# Patient Record
Sex: FEMALE | Race: WHITE | Employment: OTHER | URBAN - METROPOLITAN AREA
[De-identification: names, ages, dates, MRNs, and addresses within clinical notes are randomized per-mention and may not be internally consistent; named-entity substitution may affect disease eponyms.]

---

## 2019-11-18 ENCOUNTER — OFFICE VISIT (OUTPATIENT)
Dept: OBGYN CLINIC | Age: 34
End: 2019-11-18

## 2019-11-18 ENCOUNTER — HOSPITAL ENCOUNTER (OUTPATIENT)
Dept: LAB | Age: 34
Discharge: HOME | End: 2019-11-18
Payer: COMMERCIAL

## 2019-11-18 VITALS
DIASTOLIC BLOOD PRESSURE: 55 MMHG | RESPIRATION RATE: 20 BRPM | WEIGHT: 176.2 LBS | OXYGEN SATURATION: 99 % | SYSTOLIC BLOOD PRESSURE: 91 MMHG | HEIGHT: 70 IN | BODY MASS INDEX: 25.22 KG/M2 | HEART RATE: 76 BPM

## 2019-11-18 DIAGNOSIS — Z30.431 ENCOUNTER FOR ROUTINE CHECKING OF INTRAUTERINE CONTRACEPTIVE DEVICE (IUD): ICD-10-CM

## 2019-11-18 DIAGNOSIS — R10.2 CHRONIC PELVIC PAIN IN FEMALE: ICD-10-CM

## 2019-11-18 DIAGNOSIS — G89.29 CHRONIC PELVIC PAIN IN FEMALE: ICD-10-CM

## 2019-11-18 DIAGNOSIS — Z01.419 WELL WOMAN EXAM WITH ROUTINE GYNECOLOGICAL EXAM: Primary | ICD-10-CM

## 2019-11-18 DIAGNOSIS — Z01.419 WELL WOMAN EXAM WITH ROUTINE GYNECOLOGICAL EXAM: ICD-10-CM

## 2019-11-18 PROBLEM — E11.9 DIABETES MELLITUS (HCC): Status: ACTIVE | Noted: 2019-11-18

## 2019-11-18 PROCEDURE — 87624 HPV HI-RISK TYP POOLED RSLT: CPT

## 2019-11-18 PROCEDURE — 86780 TREPONEMA PALLIDUM: CPT

## 2019-11-18 PROCEDURE — 86803 HEPATITIS C AB TEST: CPT

## 2019-11-18 PROCEDURE — 36415 COLL VENOUS BLD VENIPUNCTURE: CPT

## 2019-11-18 PROCEDURE — 87389 HIV-1 AG W/HIV-1&-2 AB AG IA: CPT

## 2019-11-18 PROCEDURE — 87340 HEPATITIS B SURFACE AG IA: CPT

## 2019-11-18 PROCEDURE — 88175 CYTOPATH C/V AUTO FLUID REDO: CPT

## 2019-11-18 NOTE — PROGRESS NOTES
Subjective:   37 y.o. female for Well Woman Check. Reports intermittent RLQ pain x 16 years. Has been worked up extensively with no positive pathology noted. Most recent visit to [facility] with normal CT scan and TVUS. Patient's last menstrual period was 11/04/2019 (exact date). Social History: single partner, contraception - IUD. Pertinent past medical hstory: current smoker. Patient Active Problem List   Diagnosis Code    Hx-cervical dysplasia     PCOD (polycystic ovarian disease) E28.2    Hirsutism L68.0    Diabetes mellitus ([address] E11.9     Patient Active Problem List    Diagnosis Date Noted    Diabetes mellitus ([facility] [address] 11/18/2019    Hx-cervical dysplasia 04/12/2011    PCOD (polycystic ovarian disease) 04/12/2011    Hirsutism 04/12/2011     Current Outpatient Medications   Medication Sig Dispense Refill    INTRAUTERINE DEVICE, IUD, IU by IntraUTERine route.  oxycodone-acetaminophen (PERCOCET) 5-325 mg per tablet Take 1 Tab by mouth every four (4) hours as needed for Pain. 20 Tab 0    metformin ER (GLUCOPHAGE XR) 500 mg tablet Take 1 Tab by mouth daily (with dinner). Take 2 tabs PO with  Breakfast and  With dinner, ignore first sig 120 Tab 2    glyBURIDE (DIABETA) 5 mg tablet Take 0.5 Tabs by mouth Daily (before breakfast). 30 Tab 1    PRENATAL VIT/FE FUMARATE/FA (PRENATAL PO) Take  by mouth. Allergies   Allergen Reactions    Contrast Agent [Iodine] Hives     Past Medical History:   Diagnosis Date    Gestational diabetes     Gestational diabetes mellitus, class A2 8/10/2011    Gestational diabetes mellitus, class A2 8/10/2011    PCOS (polycystic ovarian syndrome)      Past Surgical History:   Procedure Laterality Date    HX COLPOSCOPY  2008    HX LEEP PROCEDURE  2008    LAPAROSCOPY ABDOMEN DIAGNOSTIC  9/2009     History reviewed. No pertinent family history.   Social History     Tobacco Use    Smoking status: Current Every Day Smoker    Smokeless tobacco: Current User   [employer] comment: vape   Substance Use Topics    Alcohol use: Yes     Comment: socially        ROS:  Feeling well. No dyspnea or chest pain on exertion. No abdominal pain, change in bowel habits, black or bloody stools. No urinary tract symptoms. GYN ROS: no breast pain or new or enlarging lumps on self exam. No neurological complaints. Objective:     Visit Vitals  BP 91/55   Pulse 76   Resp 20   Ht 5' 10\" (1.778 m)   Wt 176 lb 3.2 oz (79.9 kg)   LMP 11/04/2019 (Exact Date)   SpO2 99%   Breastfeeding? Unknown   BMI 25.28 kg/m²     The patient appears well, alert, oriented x 3, in no distress. ENT normal.  Neck supple. No adenopathy or thyromegaly. INOCENCIA. Lungs are clear, good air entry, no wheezes, rhonchi or rales. S1 and S2 normal, no murmurs, regular rate and rhythm. Abdomen soft without tenderness, guarding, mass or organomegaly. Extremities show no edema, normal peripheral pulses. Neurological is normal, no focal findings. BREAST EXAM: breasts appear normal, no suspicious masses, no skin or nipple changes or axillary nodes    PELVIC EXAM: normal external genitalia, vulva, vagina, cervix, uterus and adnexa, IUD strings visualized without difficulty    Assessment/Plan:   well woman  no contraindication to continue hormonal therapy  pap smear  return annually or prn    ICD-10-CM ICD-9-CM    1. Well woman exam with routine gynecological exam Z01.419 V72.31 PAP IG, APTIMA HPV AND RFX 16/18,45 ([id number])      T PALLIDUM AB      HEP B SURFACE AG      HEPATITIS C AB      HIV 1/2 AG/AB, 4TH GENERATION,W RFLX CONFIRM   2. Chronic pelvic pain in female R10.2 625.9     G89.29 338.29    3. Encounter for routine checking of intrauterine contraceptive device (IUD) Z30.431 V25.42      Encounter Diagnoses   Name Primary?     Well woman exam with routine gynecological exam Yes    Chronic pelvic pain in female     Encounter for routine checking of intrauterine contraceptive device (IUD)      Orders Placed This Encounter  T PALLIDUM AB    HEP B SURFACE AG    HEPATITIS C AB    HIV 1/2 AG/AB, 4TH GENERATION,W RFLX CONFIRM    INTRAUTERINE DEVICE, IUD, IU    PAP IG, APTIMA HPV AND RFX 16/18,45 ([id number])     Follow-up and Dispositions    · Return in about 1 year (around 11/18/2020) for Annual Exam or prn. [place]

## 2019-11-19 LAB
HBV SURFACE AG SER QL: <0.1 INDEX
HBV SURFACE AG SER QL: NEGATIVE
HCV AB SER IA-ACNC: 0.05 INDEX
HCV AB SERPL QL IA: NEGATIVE
HCV COMMENT,HCGAC: NORMAL
HIV 1+2 AB+HIV1 P24 AG SERPL QL IA: NONREACTIVE
HIV12 RESULT COMMENT, HHIVC: NORMAL
T PALLIDUM AB SER QL IA: NONREACTIVE

## 2020-01-13 RX ORDER — IBUPROFEN 200 MG
600 TABLET ORAL
COMMUNITY

## 2020-01-13 NOTE — PERIOP NOTES
PAT - SURGICAL PRE-ADMISSION INSTRUCTIONS    NAME:  Chinedu Brower                                                          TODAY'S DATE:  1/13/2020    SURGERY DATE:  1/15/2020                                  SURGERY ARRIVAL TIME:   0800 TBV    1. Do NOT eat or drink anything, including candy or gum, after MIDNIGHT on 1/14/20 , unless you have specific instructions from your Surgeon or Anesthesia Provider to do so. 2. No smoking on the day of surgery. 3. No alcohol 24 hours prior to the day of surgery. 4. No recreational drugs for one week prior to the day of surgery. 5. Leave all valuables, including money/purse, at home. 6. Remove all jewelry, nail polish, makeup (including mascara); no lotions, powders, deodorant, or perfume/cologne/after shave. 7. Glasses/Contact lenses and Dentures may be worn to the hospital.  They will be removed prior to surgery. 8. Call your doctor if symptoms of a cold or illness develop within 24 ours prior to surgery. 9. AN ADULT MUST DRIVE YOU HOME AFTER OUTPATIENT SURGERY. 10. If you are having an OUTPATIENT procedure, please make arrangements for a responsible adult to be with you for 24 hours after your surgery. 11. If you are admitted to the hospital, you will be assigned to a bed after surgery is complete. Normally a family member will not be able to see you until you are in your assigned bed. 15. Family is encouraged to accompany you to the hospital.  We ask visitors in the treatment area to be limited to ONE person at a time to ensure patient privacy. EXCEPTIONS WILL BE MADE AS NEEDED. 15. Children under 12 are discouraged from entering the treatment area and need to be supervised by an adult when in the waiting room. Special Instructions:    NONE. Patient Prep:    shower with anti-bacterial soap    These surgical instructions were reviewed with PATIENT during the PAT PHONE CALL. Directions:   On the morning of surgery, please go to the [facility]. Enter the building from the [facility] entrance, 1st floor (next to the Emergency Room entrance). Take the elevator to the 2nd floor. Sign in at the Registration Desk.     If you have any questions and/or concerns, please do not hesitate to call:  (Prior to the day of surgery)  [facility] unit:  [phone]  (Day of surgery)  [facility] unit:  [phone]

## 2020-01-14 ENCOUNTER — ANESTHESIA EVENT (OUTPATIENT)
Dept: SURGERY | Age: 35
End: 2020-01-14
Payer: COMMERCIAL

## 2020-01-15 ENCOUNTER — HOSPITAL ENCOUNTER (OUTPATIENT)
Age: 35
Setting detail: OUTPATIENT SURGERY
Discharge: HOME | End: 2020-01-15
Attending: SINGLE SPECIALTY | Admitting: SINGLE SPECIALTY
Payer: COMMERCIAL

## 2020-01-15 ENCOUNTER — ANESTHESIA (OUTPATIENT)
Dept: SURGERY | Age: 35
End: 2020-01-15
Payer: COMMERCIAL

## 2020-01-15 VITALS
TEMPERATURE: 97.8 F | HEIGHT: 69 IN | SYSTOLIC BLOOD PRESSURE: 91 MMHG | DIASTOLIC BLOOD PRESSURE: 62 MMHG | RESPIRATION RATE: 16 BRPM | OXYGEN SATURATION: 100 % | HEART RATE: 73 BPM | BODY MASS INDEX: 26.39 KG/M2 | WEIGHT: 178.2 LBS

## 2020-01-15 LAB — HCG UR QL: NEGATIVE

## 2020-01-15 PROCEDURE — 81025 URINE PREGNANCY TEST: CPT

## 2020-01-15 PROCEDURE — 77030013708 HC HNDPC SUC IRR PULS STRY –B: Performed by: SINGLE SPECIALTY

## 2020-01-15 PROCEDURE — 77030036719 HC BIT DRL FIXOS STRY -C: Performed by: SINGLE SPECIALTY

## 2020-01-15 PROCEDURE — 74011250636 HC RX REV CODE- 250/636: Performed by: NURSE ANESTHETIST, CERTIFIED REGISTERED

## 2020-01-15 PROCEDURE — 77030002916 HC SUT ETHLN J&J -A: Performed by: SINGLE SPECIALTY

## 2020-01-15 PROCEDURE — C1713 ANCHOR/SCREW BN/BN,TIS/BN: HCPCS | Performed by: SINGLE SPECIALTY

## 2020-01-15 PROCEDURE — 74011250636 HC RX REV CODE- 250/636: Performed by: SINGLE SPECIALTY

## 2020-01-15 PROCEDURE — 77030036718 HC BIT DRL FIXOS STRY -B: Performed by: SINGLE SPECIALTY

## 2020-01-15 PROCEDURE — 77030006788 HC BLD SAW OSC STRY -B: Performed by: SINGLE SPECIALTY

## 2020-01-15 PROCEDURE — 77030031139 HC SUT VCRL2 J&J -A: Performed by: SINGLE SPECIALTY

## 2020-01-15 PROCEDURE — 77030036714 HC WRE K FIXOS STRY -B: Performed by: SINGLE SPECIALTY

## 2020-01-15 PROCEDURE — 77030018836 HC SOL IRR NACL ICUM -A: Performed by: SINGLE SPECIALTY

## 2020-01-15 PROCEDURE — 76210000021 HC REC RM PH II 0.5 TO 1 HR: Performed by: SINGLE SPECIALTY

## 2020-01-15 PROCEDURE — 74011000272 HC RX REV CODE- 272: Performed by: SINGLE SPECIALTY

## 2020-01-15 PROCEDURE — 76060000033 HC ANESTHESIA 1 TO 1.5 HR: Performed by: SINGLE SPECIALTY

## 2020-01-15 PROCEDURE — 77030004407 HC BUR OVL CNMD -B: Performed by: SINGLE SPECIALTY

## 2020-01-15 PROCEDURE — 77030019895 HC PCKNG STRP IODO -A: Performed by: SINGLE SPECIALTY

## 2020-01-15 PROCEDURE — 74011000250 HC RX REV CODE- 250: Performed by: SINGLE SPECIALTY

## 2020-01-15 PROCEDURE — 74011250637 HC RX REV CODE- 250/637: Performed by: NURSE ANESTHETIST, CERTIFIED REGISTERED

## 2020-01-15 PROCEDURE — 76010000149 HC OR TIME 1 TO 1.5 HR: Performed by: SINGLE SPECIALTY

## 2020-01-15 RX ORDER — SODIUM CHLORIDE 0.9 % (FLUSH) 0.9 %
5-40 SYRINGE (ML) INJECTION EVERY 8 HOURS
Status: CANCELLED | OUTPATIENT
Start: 2020-01-15

## 2020-01-15 RX ORDER — INSULIN LISPRO 100 [IU]/ML
INJECTION, SOLUTION INTRAVENOUS; SUBCUTANEOUS ONCE
Status: DISCONTINUED | OUTPATIENT
Start: 2020-01-16 | End: 2020-01-15 | Stop reason: HOSPADM

## 2020-01-15 RX ORDER — PROPOFOL 10 MG/ML
INJECTION, EMULSION INTRAVENOUS AS NEEDED
Status: DISCONTINUED | OUTPATIENT
Start: 2020-01-15 | End: 2020-01-15 | Stop reason: HOSPADM

## 2020-01-15 RX ORDER — FAMOTIDINE 20 MG/1
20 TABLET, FILM COATED ORAL ONCE
Status: COMPLETED | OUTPATIENT
Start: 2020-01-15 | End: 2020-01-15

## 2020-01-15 RX ORDER — SODIUM CHLORIDE 0.9 % (FLUSH) 0.9 %
5-40 SYRINGE (ML) INJECTION EVERY 8 HOURS
Status: DISCONTINUED | OUTPATIENT
Start: 2020-01-15 | End: 2020-01-15 | Stop reason: HOSPADM

## 2020-01-15 RX ORDER — ONDANSETRON 2 MG/ML
4 INJECTION INTRAMUSCULAR; INTRAVENOUS ONCE
Status: CANCELLED | OUTPATIENT
Start: 2020-01-15 | End: 2020-01-15

## 2020-01-15 RX ORDER — SODIUM CHLORIDE 0.9 % (FLUSH) 0.9 %
5-40 SYRINGE (ML) INJECTION AS NEEDED
Status: CANCELLED | OUTPATIENT
Start: 2020-01-15

## 2020-01-15 RX ORDER — DIPHENHYDRAMINE HYDROCHLORIDE 50 MG/ML
12.5 INJECTION, SOLUTION INTRAMUSCULAR; INTRAVENOUS
Status: CANCELLED | OUTPATIENT
Start: 2020-01-15

## 2020-01-15 RX ORDER — BUPIVACAINE HYDROCHLORIDE 5 MG/ML
INJECTION, SOLUTION EPIDURAL; INTRACAUDAL AS NEEDED
Status: DISCONTINUED | OUTPATIENT
Start: 2020-01-15 | End: 2020-01-15 | Stop reason: HOSPADM

## 2020-01-15 RX ORDER — HYDROCODONE BITARTRATE AND ACETAMINOPHEN 7.5; 325 MG/1; MG/1
1 TABLET ORAL AS NEEDED
Status: CANCELLED | OUTPATIENT
Start: 2020-01-15

## 2020-01-15 RX ORDER — LIDOCAINE HYDROCHLORIDE 10 MG/ML
INJECTION INFILTRATION; PERINEURAL AS NEEDED
Status: DISCONTINUED | OUTPATIENT
Start: 2020-01-15 | End: 2020-01-15 | Stop reason: HOSPADM

## 2020-01-15 RX ORDER — SODIUM CHLORIDE 0.9 % (FLUSH) 0.9 %
5-40 SYRINGE (ML) INJECTION AS NEEDED
Status: DISCONTINUED | OUTPATIENT
Start: 2020-01-15 | End: 2020-01-15 | Stop reason: HOSPADM

## 2020-01-15 RX ORDER — SODIUM CHLORIDE, SODIUM LACTATE, POTASSIUM CHLORIDE, CALCIUM CHLORIDE 600; 310; 30; 20 MG/100ML; MG/100ML; MG/100ML; MG/100ML
50 INJECTION, SOLUTION INTRAVENOUS CONTINUOUS
Status: DISCONTINUED | OUTPATIENT
Start: 2020-01-15 | End: 2020-01-15 | Stop reason: HOSPADM

## 2020-01-15 RX ORDER — CEFAZOLIN SODIUM 2 G/50ML
2 SOLUTION INTRAVENOUS ONCE
Status: COMPLETED | OUTPATIENT
Start: 2020-01-15 | End: 2020-01-15

## 2020-01-15 RX ORDER — FENTANYL CITRATE 50 UG/ML
INJECTION, SOLUTION INTRAMUSCULAR; INTRAVENOUS AS NEEDED
Status: DISCONTINUED | OUTPATIENT
Start: 2020-01-15 | End: 2020-01-15 | Stop reason: HOSPADM

## 2020-01-15 RX ORDER — PROPOFOL 10 MG/ML
INJECTION, EMULSION INTRAVENOUS
Status: DISCONTINUED | OUTPATIENT
Start: 2020-01-15 | End: 2020-01-15 | Stop reason: HOSPADM

## 2020-01-15 RX ADMIN — SODIUM CHLORIDE, SODIUM LACTATE, POTASSIUM CHLORIDE, AND CALCIUM CHLORIDE 50 ML/HR: 600; 310; 30; 20 INJECTION, SOLUTION INTRAVENOUS at 08:56

## 2020-01-15 RX ADMIN — PROPOFOL 80 MG: 10 INJECTION, EMULSION INTRAVENOUS at 11:20

## 2020-01-15 RX ADMIN — CEFAZOLIN SODIUM 2 G: 2 SOLUTION INTRAVENOUS at 11:21

## 2020-01-15 RX ADMIN — FENTANYL CITRATE 25 MCG: 50 INJECTION, SOLUTION INTRAMUSCULAR; INTRAVENOUS at 11:25

## 2020-01-15 RX ADMIN — PROPOFOL 100 MCG/KG/MIN: 10 INJECTION, EMULSION INTRAVENOUS at 11:20

## 2020-01-15 RX ADMIN — FAMOTIDINE 20 MG: 20 TABLET ORAL at 08:59

## 2020-01-15 RX ADMIN — MIDAZOLAM 2 MG: 1 INJECTION INTRAMUSCULAR; INTRAVENOUS at 11:11

## 2020-01-15 RX ADMIN — FENTANYL CITRATE 50 MCG: 50 INJECTION, SOLUTION INTRAMUSCULAR; INTRAVENOUS at 11:19

## 2020-01-15 NOTE — DISCHARGE INSTRUCTIONS
Patient Education        Bunionectomy: What to Expect at [address] had bunion surgery to remove a lump of bone (bunion) from the joint where your big toe joins your foot, and to straighten your big toe. You will have pain and swelling that slowly improves in the 6 weeks after surgery. You may have some minor pain and swelling that lasts as long as 6 months to a year. After surgery, you will need to wear a cast or a special type of shoe to protect your toe and to keep it in the right position for at least 3 to 6 weeks. After some types of surgeries, a cast or special shoe is used for a few months. Your doctor will remove your stitches or sutures about 2 weeks after the surgery. If you have removable pins holding your toe in place, they are usually removed in about 4 to 6 weeks. This care sheet gives you a general idea about how long it will take for you to recover. But each person recovers at a different pace. Follow the steps below to get better as quickly as possible. How can you care for yourself at home? Activity    · Rest when you feel tired. Getting enough sleep will help you recover.     · Ask your doctor when you can drive again.     · You may shower, unless your doctor tells you not to. Keep the bandage dry. If the bandage has been removed, you can wash the area with warm water and soap. Pat the area dry.     · You will probably need to take several weeks off from work. How much time you need to take off depends on the type of work you do and the extent of your surgery.     · You may need to avoid heavy lifting for 3 to 8 weeks or longer, depending on the type of surgery you had.     · You may need to do regular rehabilitation (rehab) exercises to strengthen your foot and improve movement. Start out slowly, and follow your doctor's instructions. Diet    · You can eat your normal diet. If your stomach is upset, try bland, low-fat foods like plain rice, broiled chicken, toast, and yogurt.   · You may notice that your bowel movements are not regular right after your surgery. This is common. Try to avoid constipation and straining with bowel movements. You may want to take a fiber supplement every day. If you have not had a bowel movement after a couple of days, ask your doctor about taking a mild laxative. Medicines    · Your doctor will tell you if and when you can restart your medicines. He or she will also give you instructions about taking any new medicines.     · If you take blood thinners, such as warfarin (Coumadin), clopidogrel (Plavix), or aspirin, be sure to talk to your doctor. He or she will tell you if and when to start taking those medicines again. Make sure that you understand exactly what your doctor wants you to do.     · Be safe with medicines. Take pain medicines exactly as directed. ? If the doctor gave you a prescription medicine for pain, take it as prescribed. ? If you are not taking a prescription pain medicine, ask your doctor if you can take an over-the-counter medicine.     · If your doctor prescribed antibiotics, take them as directed. Do not stop taking them just because you feel better. You need to take the full course of antibiotics.     · If you think your pain medicine is making you sick to your stomach:  ? Take your medicine after meals (unless your doctor has told you not to). ? Ask your doctor for a different pain medicine. Incision care    · You will leave the hospital with bandages holding your toe in the correct position. Your doctor will probably remove the bandages after several days. Or your doctor may have you remove your bandages at home. Do not touch the surgery area. Keep it dry.     · Do not soak your foot until your doctor says it is okay. Ice and elevation    · For pain and swelling, put ice or a cold pack on your foot for 10 to 20 minutes each hour.  Put a thin cloth between the ice and your skin.     · Prop up your foot and leg on a pillow when you ice it or anytime you sit or lie down during the next 3 days. Try to keep it above the level of your heart. This will help reduce swelling. Follow-up care is a key part of your treatment and safety. Be sure to make and go to all appointments, and call your doctor if you are having problems. It's also a good idea to know your test results and keep a list of the medicines you take. When should you call for help? Call 911 anytime you think you may need emergency care. For example, call if:    · You passed out (lost consciousness).     · You have sudden chest pain and shortness of breath, or you cough up blood.     · You have severe trouble breathing.    Call your doctor now or seek immediate medical care if:    · Your foot or toe is cool or pale or changes color.     · You have numbness, tingling, or less feeling in your foot or toes.     · You have pain that does not get better after you take pain medicine.     · You have loose stitches, or your incision comes open.     · Bright red blood has soaked through the bandage over your incision.     · You have signs of infection, such as:  ? Increased pain, swelling, warmth, or redness. ? Red streaks leading from the incision. ? Pus draining from the incision. ? Swollen lymph nodes in your neck, armpits, or groin. ? A fever.    Watch closely for any changes in your health, and be sure to contact your doctor if:    · You do not have a bowel movement after taking a laxative. Where can you learn more? Go to http://chuy-michael.info/. Enter [phone] in the search box to learn more about \"Bunionectomy: What to Expect at Home. \"  Current as of: June 26, 2019  Content Version: 12.2  © 8962-3489 [employer], [employer]. Care instructions adapted under license by [employer] (which disclaims liability or warranty for this information).  If you have questions about a medical condition or this instruction, always ask your healthcare professional. [address] any warranty or liability for your use of this information. DISCHARGE SUMMARY from Nurse    PATIENT INSTRUCTIONS:    After general anesthesia or intravenous sedation, for 24 hours or while taking prescription Narcotics:  · Limit your activities  · Do not drive and operate hazardous machinery  · Do not make important personal or business decisions  · Do  not drink alcoholic beverages  · If you have not urinated within 8 hours after discharge, please contact your surgeon on call. Report the following to your surgeon:  · Excessive pain, swelling, redness or odor of or around the surgical area  · Temperature over 100.5  · Nausea and vomiting lasting longer than 4 hours or if unable to take medications  · Any signs of decreased circulation or nerve impairment to extremity: change in color, persistent  numbness, tingling, coldness or increase pain  · Any questions      These are general instructions for a healthy lifestyle:    No smoking/ No tobacco products/ Avoid exposure to second hand smoke  Surgeon General's Warning:  Quitting smoking now greatly reduces serious risk to your health. Obesity, smoking, and sedentary lifestyle greatly increases your risk for illness    A healthy diet, regular physical exercise & weight monitoring are important for maintaining a healthy lifestyle    You may be retaining fluid if you have a history of heart failure or if you experience any of the following symptoms:  Weight gain of 3 pounds or more overnight or 5 pounds in a week, increased swelling in our hands or feet or shortness of breath while lying flat in bed. Please call your doctor as soon as you notice any of these symptoms; do not wait until your next office visit. The discharge information has been reviewed with the patient. The patient verbalized understanding.   Discharge medications reviewed with the patient and appropriate educational materials and side effects teaching were provided. _________________________________________________________________________________________________________________________Patient armband removed and given to patient to take home.   Patient was informed of the privacy risks if armband lost or stolen  __________

## 2020-01-15 NOTE — ANESTHESIA PREPROCEDURE EVALUATION
Relevant Problems   No relevant active problems       Anesthetic History   No history of anesthetic complications            Review of Systems / Medical History  Patient summary reviewed, nursing notes reviewed and pertinent labs reviewed    Pulmonary          Smoker      Comments: Inhaled one cigarette this morning. Neuro/Psych         Psychiatric history     Cardiovascular  Within defined limits                Exercise tolerance: >4 METS     GI/Hepatic/Renal  Within defined limits              Endo/Other            Comments: h/o gestational diabetes and PCOS noted. Other Findings            Physical Exam    Airway  Mallampati: II  TM Distance: 4 - 6 cm  Neck ROM: normal range of motion   Mouth opening: Normal     Cardiovascular  Regular rate and rhythm,  S1 and S2 normal,  no murmur, click, rub, or gallop  Rhythm: regular  Rate: normal         Dental    Dentition: Caps/crowns  Comments: #8 veneer (fragile); risks of dental trauma discussed.    Pulmonary  Breath sounds clear to auscultation               Abdominal  Abdominal exam normal       Other Findings            Anesthetic Plan    ASA: 2  Anesthesia type: MAC          Induction: Intravenous  Anesthetic plan and risks discussed with: Patient

## 2020-01-15 NOTE — ANESTHESIA POSTPROCEDURE EVALUATION
Procedure(s):  Right foot double osteotomy bunionectomy. MAC    Anesthesia Post Evaluation      Multimodal analgesia: multimodal analgesia used between 6 hours prior to anesthesia start to PACU discharge  Patient location during evaluation: PACU  Patient participation: complete - patient participated  Level of consciousness: awake  Pain score: 0  Pain management: adequate  Airway patency: patent  Anesthetic complications: no  Cardiovascular status: acceptable  Respiratory status: acceptable  Hydration status: acceptable  Post anesthesia nausea and vomiting:  none      No vitals data found for the desired time range.

## 2020-01-15 NOTE — BRIEF OP NOTE
BRIEF OPERATIVE NOTE    Date of Procedure: 1/15/2020   Preoperative Diagnosis: m20.11  m21.611  m79.671  Postoperative Diagnosis: m20.11  m21.611  m79.671    Procedure(s):  Right foot lemuel bunionectomy  Surgeon(s) and Role: * Casimiro Menendez., DPM - Primary         Surgical Assistant: see below    Surgical Staff:  Circ-1: Jose Mae RN  Circ-2: Corrine Bland Tech-1: Sharon Rothman  Surg Asst-1: Alexandra Duque  Surg Asst-Relief: Lewie Ganser  Event Time In Time Out   Incision Start 1134    Incision Close 1225      Anesthesia: MAC  With 20cc 1% lidocaine plain pre op, 10cc . 50% marcaine plain post op  Estimated Blood Loss: minimal  Specimens: * No specimens in log *   Findings: Hallux valgus deformity   Complications: none  Implants:   Implant Name Type Inv.  Item Serial No.  Lot No. LRB No. Used Action   2.5 X 12MM SCREW Screw  [id number] Hired  Right 2 Implanted

## 2020-01-15 NOTE — PERIOP NOTES
Pre-Op Summary Pt arrived via car with family/friend and is oriented to time, place, person and situation. Visit Vitals Ht 5' 10\" (1.778 m) Wt 79.4 kg (175 lb) LMP 12/30/2019 Breastfeeding No  
BMI 25.11 kg/m² Patients belongings are located with . Patient's point of contact is Dhaval Cortes,   and their contact number [phone] They will be in the waiting room. They are able to receive medication information. They will be their ride home.

## 2020-01-15 NOTE — PERIOP NOTES
Phase 2 Recovery Summary    Report received from OR staff    Vitals:    01/13/20 1126 01/15/20 0849 01/15/20 1237 01/15/20 1238   BP:  108/73 91/62 91/62   Pulse:  77 75 73   Resp:  16 16 16   Temp:  97.8 °F (36.6 °C)     SpO2:  100% 100% 100%   Weight: 79.4 kg (175 lb) 80.8 kg (178 lb 3.2 oz)     Height: 5' 10\" (1.778 m) 5' 9\" (1.753 m)         oriented to time, place, person and situation          Lines and Drains  Peripherally Inserted Central Catheter:      Wound  Wound Foot Right (Active)   Dressing Status Clean, dry, and intact 1/15/2020 12:44 PM   Dressing Type 4 x 4;Elastic bandage 1/15/2020 12:44 PM   Incision Site Well Approximated Yes 1/15/2020 12:25 PM   Number of days: 0    crutches provided. And crutch training. Return demo. Patient assisted to chair with minimal assist. Pateint tolerating liquids well. Patient's family at bedside. Discharge discussed with patient and family. No questions or concerns at this time. Patient had time to ask any questions. Patient discharged to home, with .       Malik Slaughter RN

## 2020-01-29 RX ORDER — MIDAZOLAM HYDROCHLORIDE 1 MG/ML
INJECTION, SOLUTION INTRAMUSCULAR; INTRAVENOUS AS NEEDED
Status: DISCONTINUED | OUTPATIENT
Start: 2020-01-15 | End: 2020-01-29 | Stop reason: HOSPADM

## 2020-01-29 NOTE — ADDENDUM NOTE
Addendum  created 01/29/20 1250 by Rand Gutiérrez CRNA    Intraprocedure Meds edited, Orders acknowledged in Narrator

## 2020-02-06 NOTE — OP NOTES
[facility]  OPERATIVE REPORT    Name:  Deann Brown  MR#:   [id number]  :  1985  ACCOUNT #:  [de-identified]  DATE OF SERVICE:  01/15/2020    PREOPERATIVE DIAGNOSIS:  Right foot bunion. POSTOPERATIVE DIAGNOSIS:  Right foot bunion. PROCEDURE PERFORMED:  Right foot Jeremy bunionectomy. SURGEON:  Shelley Menendez DPM    ASSISTANT:  None. ANESTHESIA:  MAC with 20 mL of lidocaine plain preop and 10 mL of 0.5% Marcaine plain postoperatively. HEMOSTASIS:  Pneumatic ankle tourniquet set at 250 mmHg for 60 minutes. COMPLICATIONS:  None. SPECIMENS REMOVED:  None. IMPLANTS:  See below    ESTIMATED BLOOD LOSS:  Minimal.    MATERIALS USED:  A Toney 2.5 x 12 mm screw. INJECTIONS:  None. INDICATIONS FOR THE PROCEDURE:  The patient presented to the hospital with the above chief complaint. All conservative treatment options have been utilized by the patient without long-term relief of her symptoms. The patient requires surgical intervention at this time. The consent was reviewed, signed, and is in the patient's chart. All risks, benefits, complications, and alternatives were discussed with the patient. All questions were answered to the patient's satisfaction and no guarantees were made to the outcome of the procedure. PROCEDURE:  The patient was brought to the operating room, placed on the operating table in the supine position with safety straps across her legs. Once adequate amount of anesthesia was achieved by the Department of Anesthesia, a local block consisting of 20 mL of 1% lidocaine plain was administered to the patient's right foot. The foot was then prepped and draped in the usual sterile manner. An Esmarch bandage was applied to the patient's right foot and the tourniquet was inflated to 250 mmHg. Attention was directed to the right foot, whereupon examination there was noted to be a moderately-sized bunion deformity noted to the first ray.   Next, using a #15 blade, a dorsomedial incision was made over the first metatarsophalangeal joint. The incision was deepened using a combination of blunt and sharp dissection. All bleeders were cauterized or ligated as deemed necessary throughout the duration of the case. The incision was deepened further down to the level of the extensor tendon. The extensor tendon was identified and retracted out of the way. Next, the incision was deepened further down to the level of the periosteum. Using a fresh #15 blade, a linear incision was made over the periosteum. Using a Hamilton elevator, this was reflected off the metatarsophalangeal joint in full. Once adequate exposure was achieved to the metatarsophalangeal joint, the articular cartilage was evaluated and noted to be healthy and viable. There was noted to be a large prominent medial eminence to the first metatarsal head. Using a sagittal saw, this was resected in full. Next, the sagittal saw was used to make a Chevron-type osteotomy in the first metatarsal head. Once this was achieved, the capital fragment was translocated laterally and correction of the intermetatarsal angle was noted. The osteotomy was temporarily held in place using two 0.045 K-wires. Next, using standard AO technique, two 2.5 x 12 mm cannulated Toney screws were inserted across the osteotomy site. Adequate fixation was noted. Adequate placement was confirmed using fluoroscopy. Next, the K-wires were removed in full and the osteotomy site appeared stable. The first ray appeared to be in a more rectus position. Next, the incision site was flushed using copious amounts of normal sterile saline solution. The subcutaneous tissues were closed using 3-0 Vicryl. The subcuticular and skin incisions were closed using 3-0 Monocryl. A sterile dressing consisting of Xeroform, 4x4s, Kerlix, and an Ace wrap was applied to the patient's right foot.   The tourniquet was deflated after an hour with immediate hyperemia of digits 1 through 5 to the right foot. The patient tolerated the above procedure and anesthesia well without any complication. The patient was transported to the PACU with vital signs stable and vascular status intact.         MELANIE [email or web address]  D:  02/05/2020 8:16  T:  02/06/2020 0:30  JOB #:  [id number]

## (undated) DEVICE — HANDPIECE SET WITH SOFT TISSUE TIP AND SUCTION TUBE: Brand: INTERPULSE

## (undated) DEVICE — Device

## (undated) DEVICE — SUTURE ETHLN SZ 3-0 L30IN NONABSORBABLE BLK L30MM PSLX 3/8 1691H

## (undated) DEVICE — PREP SKN CHLRAPRP 26ML TNT -- CONVERT TO ITEM 373320

## (undated) DEVICE — NEEDLE HYPO 25GA L1.5IN BLU POLYPR HUB S STL REG BVL STR

## (undated) DEVICE — DRAPE,EXTREMITY,89X128,STERILE: Brand: MEDLINE

## (undated) DEVICE — BNDG CMPR ELAS KNT VEL STD 4IN -- MEDICHOICE

## (undated) DEVICE — NDL PRT INJ NSAF BLNT 18GX1.5 --

## (undated) DEVICE — BANDAGE COMPR EXSANGUATION SGL LAYERED NO CLSR 9FT LEN 4IN W

## (undated) DEVICE — SOLUTION IV 1000ML 0.9% SOD CHL

## (undated) DEVICE — CANNULATED SELF-TAPPING COMPRESSION SCREW
Type: IMPLANTABLE DEVICE | Site: FOOT | Status: NON-FUNCTIONAL
Brand: FIXOS
Removed: 2020-01-15

## (undated) DEVICE — SUTURE VCRL SZ 3-0 L27IN ABSRB UD L26MM SH 1/2 CIR J416H

## (undated) DEVICE — DISPOSABLE TOURNIQUET CUFF SINGLE BLADDER, SINGLE PORT AND QUICK CONNECT CONNECTOR: Brand: COLOR CUFF

## (undated) DEVICE — KIT PROC EXTRM HND FT CUST LF --

## (undated) DEVICE — GAUZE,PACKING STRIP,IODOFORM,1/2"X5YD,ST: Brand: CURAD

## (undated) DEVICE — ROCKER SWITCH PENCIL HOLSTER: Brand: VALLEYLAB

## (undated) DEVICE — PRECISION THIN (9.0 X 0.38 X 25.0MM)

## (undated) DEVICE — STOCKINETTE IMPERV REG 9X48IN --

## (undated) DEVICE — SYR 10ML CTRL LR LCK NSAF LF --

## (undated) DEVICE — BUR OVL SLD STR 8 FLUT CARB M 4MMX8MM 1.9IN TOT LEN

## (undated) DEVICE — STERILE POLYISOPRENE POWDER-FREE SURGICAL GLOVES: Brand: PROTEXIS

## (undated) DEVICE — INTENDED FOR TISSUE SEPARATION, AND OTHER PROCEDURES THAT REQUIRE A SHARP SURGICAL BLADE TO PUNCTURE OR CUT.: Brand: BARD-PARKER ® CARBON RIB-BACK BLADES